# Patient Record
Sex: MALE | Race: WHITE | URBAN - METROPOLITAN AREA
[De-identification: names, ages, dates, MRNs, and addresses within clinical notes are randomized per-mention and may not be internally consistent; named-entity substitution may affect disease eponyms.]

---

## 2023-02-06 ENCOUNTER — OFFICE VISIT (OUTPATIENT)
Dept: URBAN - METROPOLITAN AREA CLINIC 70 | Facility: CLINIC | Age: 48
End: 2023-02-06

## 2023-02-16 ENCOUNTER — LAB OUTSIDE AN ENCOUNTER (OUTPATIENT)
Dept: URBAN - METROPOLITAN AREA CLINIC 70 | Facility: CLINIC | Age: 48
End: 2023-02-16

## 2023-02-16 ENCOUNTER — WEB ENCOUNTER (OUTPATIENT)
Dept: URBAN - METROPOLITAN AREA CLINIC 70 | Facility: CLINIC | Age: 48
End: 2023-02-16

## 2023-02-16 ENCOUNTER — OFFICE VISIT (OUTPATIENT)
Dept: URBAN - METROPOLITAN AREA CLINIC 70 | Facility: CLINIC | Age: 48
End: 2023-02-16
Payer: COMMERCIAL

## 2023-02-16 ENCOUNTER — DASHBOARD ENCOUNTERS (OUTPATIENT)
Age: 48
End: 2023-02-16

## 2023-02-16 VITALS
DIASTOLIC BLOOD PRESSURE: 78 MMHG | TEMPERATURE: 98.1 F | BODY MASS INDEX: 25.84 KG/M2 | HEART RATE: 79 BPM | SYSTOLIC BLOOD PRESSURE: 119 MMHG | WEIGHT: 184.6 LBS | HEIGHT: 71 IN

## 2023-02-16 DIAGNOSIS — Z80.0 FAMILY HX OF COLON CANCER: ICD-10-CM

## 2023-02-16 DIAGNOSIS — R10.13 EPIGASTRIC ABDOMINAL PAIN: ICD-10-CM

## 2023-02-16 DIAGNOSIS — K59.04 CHRONIC IDIOPATHIC CONSTIPATION: ICD-10-CM

## 2023-02-16 PROCEDURE — 99204 OFFICE O/P NEW MOD 45 MIN: CPT

## 2023-02-16 RX ORDER — BUPRENORPHINE HCL 8 MG/1
TABLET SUBLINGUAL
Qty: 90 TABLET | Status: ACTIVE | COMMUNITY

## 2023-02-16 RX ORDER — PANTOPRAZOLE SODIUM 40 MG/1
TAKE 1 TABLET IN THE MORNING ON AN EMPTY STOMACH, WAIT 30 MINUTES, THEN START EATING TABLET, DELAYED RELEASE ORAL ONCE A DAY
Qty: 90 | Refills: 3 | OUTPATIENT
Start: 2023-02-16

## 2023-02-16 NOTE — HPI-TODAY'S VISIT:
The pt presents for a colon cancer screening, abdominal pain, and constipation.  He has never had a colonoscopy.  His mother recently  from colon cancer.  She was diagnosed at age 64. He admits to constipation with bowel movements every 3-4 days without feelings of complete evacuation.  He is taking fiber gummies prn with minor improvement in symptoms.  He denies weight loss, rectal bleeding, or diarrhea.   He admits to a sharp epigastric abdominal pain.  He had a CCY in 2017 and states his abdominal pain feels similar to when he would have "gallbladder attacks." He admits to occasional indigestion and states he has not tried anything for his symptoms.  States symptoms seem to be worse when is stomach is empty.  He denies regurgitation, nausea, vomiting, or dysphagia.  He denies recent imaging.

## 2023-02-23 ENCOUNTER — TELEPHONE ENCOUNTER (OUTPATIENT)
Dept: URBAN - METROPOLITAN AREA CLINIC 70 | Facility: CLINIC | Age: 48
End: 2023-02-23

## 2023-02-23 PROBLEM — 82934008: Status: ACTIVE | Noted: 2023-02-16

## 2023-03-10 ENCOUNTER — CLAIMS CREATED FROM THE CLAIM WINDOW (OUTPATIENT)
Dept: URBAN - METROPOLITAN AREA SURGERY CENTER 24 | Facility: SURGERY CENTER | Age: 48
End: 2023-03-10

## 2023-03-10 ENCOUNTER — CLAIMS CREATED FROM THE CLAIM WINDOW (OUTPATIENT)
Dept: URBAN - METROPOLITAN AREA SURGERY CENTER 24 | Facility: SURGERY CENTER | Age: 48
End: 2023-03-10
Payer: COMMERCIAL

## 2023-03-10 DIAGNOSIS — Z12.11 COLON CANCER SCREENING: ICD-10-CM

## 2023-03-10 PROCEDURE — 992 APS NON BILLABLE: Performed by: INTERNAL MEDICINE

## 2023-03-13 ENCOUNTER — CLAIMS CREATED FROM THE CLAIM WINDOW (OUTPATIENT)
Dept: URBAN - METROPOLITAN AREA SURGERY CENTER 24 | Facility: SURGERY CENTER | Age: 48
End: 2023-03-13
Payer: COMMERCIAL

## 2023-03-13 ENCOUNTER — CLAIMS CREATED FROM THE CLAIM WINDOW (OUTPATIENT)
Dept: URBAN - METROPOLITAN AREA SURGERY CENTER 24 | Facility: SURGERY CENTER | Age: 48
End: 2023-03-13

## 2023-03-13 DIAGNOSIS — Z80.0 BROTHER AT YOUNG AGE FAMILY HISTORY OF COLON CANCER: ICD-10-CM

## 2023-03-13 DIAGNOSIS — Z12.11 COLON CANCER SCREENING: ICD-10-CM

## 2023-03-13 PROCEDURE — G8907 PT DOC NO EVENTS ON DISCHARG: HCPCS | Performed by: INTERNAL MEDICINE

## 2023-03-13 PROCEDURE — G0105 COLORECTAL SCRN; HI RISK IND: HCPCS | Performed by: INTERNAL MEDICINE

## 2023-03-13 RX ORDER — PANTOPRAZOLE SODIUM 40 MG/1
TAKE 1 TABLET IN THE MORNING ON AN EMPTY STOMACH, WAIT 30 MINUTES, THEN START EATING TABLET, DELAYED RELEASE ORAL ONCE A DAY
Qty: 90 | Refills: 3 | Status: ACTIVE | COMMUNITY
Start: 2023-02-16

## 2023-03-13 RX ORDER — BUPRENORPHINE HCL 8 MG/1
TABLET SUBLINGUAL
Qty: 90 TABLET | Status: ACTIVE | COMMUNITY

## 2025-04-14 ENCOUNTER — OFFICE VISIT (OUTPATIENT)
Dept: URBAN - METROPOLITAN AREA CLINIC 70 | Facility: CLINIC | Age: 50
End: 2025-04-14

## 2025-04-14 ENCOUNTER — LAB OUTSIDE AN ENCOUNTER (OUTPATIENT)
Dept: URBAN - METROPOLITAN AREA CLINIC 70 | Facility: CLINIC | Age: 50
End: 2025-04-14

## 2025-04-14 DIAGNOSIS — R10.13 EPIGASTRIC PAIN: ICD-10-CM

## 2025-04-14 DIAGNOSIS — R68.81 EARLY SATIETY: ICD-10-CM

## 2025-04-14 DIAGNOSIS — R11.2 NAUSEA AND VOMITING IN ADULT: ICD-10-CM

## 2025-04-14 DIAGNOSIS — R21 SKIN RASH: ICD-10-CM

## 2025-04-14 PROCEDURE — 99214 OFFICE O/P EST MOD 30 MIN: CPT | Performed by: NURSE PRACTITIONER

## 2025-04-14 RX ORDER — PANTOPRAZOLE SODIUM 40 MG/1
TAKE 1 TABLET IN THE MORNING ON AN EMPTY STOMACH, WAIT 30 MINUTES, THEN START EATING TABLET, DELAYED RELEASE ORAL ONCE A DAY
Qty: 90 | Refills: 3 | Status: DISCONTINUED | COMMUNITY
Start: 2023-02-16

## 2025-04-14 RX ORDER — FAMOTIDINE 20 MG/1
TAKE 1 TABLET TABLET, FILM COATED ORAL ONCE A DAY
Qty: 90 | Refills: 3 | OUTPATIENT
Start: 2025-04-14

## 2025-04-14 RX ORDER — BUPRENORPHINE HCL 8 MG/1
TABLET SUBLINGUAL
Qty: 90 TABLET | Status: ACTIVE | COMMUNITY

## 2025-04-14 RX ORDER — PANTOPRAZOLE SODIUM 40 MG/1
1 TABLET 1/2 TO 1 HOUR BEFORE MORNING MEAL TABLET, DELAYED RELEASE ORAL ONCE A DAY
Qty: 90 TABLET | Refills: 3 | OUTPATIENT
Start: 2025-04-14

## 2025-04-14 NOTE — HPI-OTHER HISTORIES
23: NING Downing The pt presents for a colon cancer screening, abdominal pain, and constipation. He has never had a colonoscopy. His mother recently  from colon cancer. She was diagnosed at age 64. He admits to constipation with bowel movements every 3-4 days without feelings of complete evacuation. He is taking fiber gummies prn with minor improvement in symptoms. He denies weight loss, rectal bleeding, or diarrhea. He admits to a sharp epigastric abdominal pain. He had a CCY in 2017 and states his abdominal pain feels similar to when he would have "gallbladder attacks." He admits to occasional indigestion and states he has not tried anything for his symptoms. States symptoms seem to be worse when is stomach is empty. He denies regurgitation, nausea, vomiting, or dysphagia. He denies recent imaging. Addended by: DOTTY LEWIS on: 6/10/2020 09:43 AM     Modules accepted: Orders

## 2025-04-14 NOTE — HPI-TODAY'S VISIT:
04/14/25: 49-year-old male presents with C/O epigastric pain, nausea with vomiting and early satiety. States that the symptoms are occurring more frequently and about 2 weeks ago he had epigastric pain that felt like he felt prior to having his CCY. States that his gallbladder ruptured in 2018. He reports nausea with vomiting that has occurred once every couple of months that is associated with significant epigastric pain. Early satiety for the last few years, reports that he was able to eat much more prior to these symptoms. States that he has a rash that is only on his arms that started after his CCY, has followed up with dermatology, tried steroids that do not resolve the rash.

## 2025-05-06 ENCOUNTER — OFFICE VISIT (OUTPATIENT)
Dept: URBAN - METROPOLITAN AREA SURGERY CENTER 24 | Facility: SURGERY CENTER | Age: 50
End: 2025-05-06

## 2025-05-20 ENCOUNTER — OFFICE VISIT (OUTPATIENT)
Dept: URBAN - METROPOLITAN AREA SURGERY CENTER 24 | Facility: SURGERY CENTER | Age: 50
End: 2025-05-20
Payer: COMMERCIAL

## 2025-05-20 DIAGNOSIS — F17.219 CIGARETTE NICOTINE DEPENDENCE WITH NICOTINE-INDUCED DISORDER: ICD-10-CM

## 2025-05-20 DIAGNOSIS — K29.80 DUODENITIS WITHOUT BLEEDING: ICD-10-CM

## 2025-05-20 DIAGNOSIS — K31.89 OTHER DISEASES OF STOMACH AND DUODENUM: ICD-10-CM

## 2025-05-20 DIAGNOSIS — R68.81 EARLY SATIETY: ICD-10-CM

## 2025-05-20 DIAGNOSIS — K31.89 REACTIVE GASTROPATHY: ICD-10-CM

## 2025-05-20 DIAGNOSIS — R11.2 NAUSEA AND VOMITING: ICD-10-CM

## 2025-05-20 DIAGNOSIS — K29.80 DUODENITIS: ICD-10-CM

## 2025-05-20 PROCEDURE — 43239 EGD BIOPSY SINGLE/MULTIPLE: CPT | Performed by: INTERNAL MEDICINE

## 2025-05-20 PROCEDURE — 00731 ANES UPR GI NDSC PX NOS: CPT | Performed by: NURSE ANESTHETIST, CERTIFIED REGISTERED

## 2025-05-20 RX ORDER — FAMOTIDINE 20 MG/1
TAKE 1 TABLET TABLET, FILM COATED ORAL ONCE A DAY
Qty: 90 | Refills: 3 | Status: ACTIVE | COMMUNITY
Start: 2025-04-14

## 2025-05-20 RX ORDER — BUPRENORPHINE HCL 8 MG/1
TABLET SUBLINGUAL
Qty: 90 TABLET | Status: ACTIVE | COMMUNITY

## 2025-05-20 RX ORDER — PANTOPRAZOLE SODIUM 40 MG/1
1 TABLET 1/2 TO 1 HOUR BEFORE MORNING MEAL TABLET, DELAYED RELEASE ORAL ONCE A DAY
Qty: 90 TABLET | Refills: 3 | Status: ACTIVE | COMMUNITY
Start: 2025-04-14

## 2025-06-03 ENCOUNTER — OFFICE VISIT (OUTPATIENT)
Dept: URBAN - METROPOLITAN AREA CLINIC 70 | Facility: CLINIC | Age: 50
End: 2025-06-03
Payer: COMMERCIAL

## 2025-06-03 ENCOUNTER — LAB OUTSIDE AN ENCOUNTER (OUTPATIENT)
Dept: URBAN - METROPOLITAN AREA CLINIC 70 | Facility: CLINIC | Age: 50
End: 2025-06-03

## 2025-06-03 DIAGNOSIS — R21 SKIN RASH: ICD-10-CM

## 2025-06-03 DIAGNOSIS — K29.70 GASTRITIS WITHOUT BLEEDING, UNSPECIFIED CHRONICITY, UNSPECIFIED GASTRITIS TYPE: ICD-10-CM

## 2025-06-03 DIAGNOSIS — R11.2 NAUSEA AND VOMITING IN ADULT: ICD-10-CM

## 2025-06-03 DIAGNOSIS — R63.4 UNINTENTIONAL WEIGHT LOSS: ICD-10-CM

## 2025-06-03 DIAGNOSIS — K29.80 DUODENITIS: ICD-10-CM

## 2025-06-03 DIAGNOSIS — R10.13 EPIGASTRIC PAIN: ICD-10-CM

## 2025-06-03 PROBLEM — 4556007: Status: ACTIVE | Noted: 2025-06-03

## 2025-06-03 PROBLEM — 72007001: Status: ACTIVE | Noted: 2025-06-03

## 2025-06-03 PROCEDURE — 99214 OFFICE O/P EST MOD 30 MIN: CPT | Performed by: NURSE PRACTITIONER

## 2025-06-03 RX ORDER — PANTOPRAZOLE SODIUM 40 MG/1
1 TABLET 1/2 TO 1 HOUR BEFORE MORNING MEAL TABLET, DELAYED RELEASE ORAL ONCE A DAY
OUTPATIENT
Start: 2025-04-14

## 2025-06-03 RX ORDER — BUPRENORPHINE HCL 8 MG/1
TABLET SUBLINGUAL
Qty: 90 TABLET | Status: ACTIVE | COMMUNITY

## 2025-06-03 RX ORDER — PANTOPRAZOLE SODIUM 40 MG/1
1 TABLET 1/2 TO 1 HOUR BEFORE MORNING MEAL TABLET, DELAYED RELEASE ORAL ONCE A DAY
Qty: 90 TABLET | Refills: 3 | Status: ACTIVE | COMMUNITY
Start: 2025-04-14

## 2025-06-03 RX ORDER — FAMOTIDINE 20 MG/1
TAKE 1 TABLET TABLET, FILM COATED ORAL ONCE A DAY
Qty: 90 | Refills: 3 | Status: ACTIVE | COMMUNITY
Start: 2025-04-14

## 2025-06-03 NOTE — HPI-TODAY'S VISIT:
06/03/25: 49-year-old male presents for follow up.Reports that he is no longer having abdominal pain, nausea or vomiting, or heart burn. He has stopped taking Goody powders and Ibuprofen. Admits to unintentional weight loss of about 20 lbs over the last couple of years. States that he skips meals at times, but he has always done so. Also reports that he has a large amount of stress at this time and acknowledges that this may be a cause of the weight loss as well. Denies diarrhea, constipation, rectal bleeding or pain. UTD on colon, 2023.   05/20/25: EGD - normal esophagus, gastritis, duodenitis. Path - (-) H. Pylori, reactive/reparative changes.

## 2025-06-03 NOTE — HPI-OTHER HISTORIES
23: NING Downing The pt presents for a colon cancer screening, abdominal pain, and constipation. He has never had a colonoscopy. His mother recently  from colon cancer. She was diagnosed at age 64. He admits to constipation with bowel movements every 3-4 days without feelings of complete evacuation. He is taking fiber gummies prn with minor improvement in symptoms. He denies weight loss, rectal bleeding, or diarrhea. He admits to a sharp epigastric abdominal pain. He had a CCY in 2017 and states his abdominal pain feels similar to when he would have "gallbladder attacks." He admits to occasional indigestion and states he has not tried anything for his symptoms. States symptoms seem to be worse when is stomach is empty. He denies regurgitation, nausea, vomiting, or dysphagia. He denies recent imaging. ------------- 25: 49-year-old male presents with C/O epigastric pain, nausea with vomiting and early satiety. States that the symptoms are occurring more frequently and about 2 weeks ago he had epigastric pain that felt like he felt prior to having his CCY. States that his gallbladder ruptured in 2018. He reports nausea with vomiting that has occurred once every couple of months that is associated with significant epigastric pain. Early satiety for the last few years, reports that he was able to eat much more prior to these symptoms. States that he has a rash that is only on his arms that started after his CCY, has followed up with dermatology, tried steroids that do not resolve the rash.

## 2025-06-27 ENCOUNTER — TELEPHONE ENCOUNTER (OUTPATIENT)
Dept: URBAN - METROPOLITAN AREA CLINIC 70 | Facility: CLINIC | Age: 50
End: 2025-06-27

## 2025-07-01 ENCOUNTER — OFFICE VISIT (OUTPATIENT)
Dept: URBAN - METROPOLITAN AREA CLINIC 70 | Facility: CLINIC | Age: 50
End: 2025-07-01

## 2025-07-23 ENCOUNTER — OFFICE VISIT (OUTPATIENT)
Dept: URBAN - METROPOLITAN AREA CLINIC 70 | Facility: CLINIC | Age: 50
End: 2025-07-23